# Patient Record
Sex: FEMALE | Race: BLACK OR AFRICAN AMERICAN | Employment: FULL TIME | URBAN - METROPOLITAN AREA
[De-identification: names, ages, dates, MRNs, and addresses within clinical notes are randomized per-mention and may not be internally consistent; named-entity substitution may affect disease eponyms.]

---

## 2019-09-29 ENCOUNTER — APPOINTMENT (OUTPATIENT)
Dept: GENERAL RADIOLOGY | Age: 44
End: 2019-09-29
Payer: OTHER MISCELLANEOUS

## 2019-09-29 ENCOUNTER — HOSPITAL ENCOUNTER (EMERGENCY)
Age: 44
Discharge: HOME | End: 2019-09-29
Attending: EMERGENCY MEDICINE
Payer: OTHER MISCELLANEOUS

## 2019-09-29 VITALS
OXYGEN SATURATION: 99 % | RESPIRATION RATE: 20 BRPM | SYSTOLIC BLOOD PRESSURE: 127 MMHG | TEMPERATURE: 98.5 F | DIASTOLIC BLOOD PRESSURE: 80 MMHG | HEART RATE: 83 BPM
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
## 2019-09-29 PROCEDURE — 71046 X-RAY EXAM CHEST 2 VIEWS: CPT

## 2019-09-29 PROCEDURE — 73502 X-RAY EXAM HIP UNI 2-3 VIEWS: CPT

## 2019-09-29 PROCEDURE — 6370000000 HC RX 637 (ALT 250 FOR IP): Performed by: STUDENT IN AN ORGANIZED HEALTH CARE EDUCATION/TRAINING PROGRAM

## 2019-09-29 PROCEDURE — 99283 EMERGENCY DEPT VISIT LOW MDM: CPT

## 2019-09-29 RX ORDER — IBUPROFEN 400 MG/1
800 TABLET ORAL ONCE
Status: COMPLETED | OUTPATIENT
Start: 2019-09-29 | End: 2019-09-29

## 2019-09-29 RX ADMIN — IBUPROFEN 800 MG: 400 TABLET ORAL at 13:10

## 2019-09-29 ASSESSMENT — ENCOUNTER SYMPTOMS
BACK PAIN: 0
VOMITING: 0
NAUSEA: 0
EYE PAIN: 0
SORE THROAT: 0
COUGH: 0
ABDOMINAL PAIN: 0
SHORTNESS OF BREATH: 0

## 2019-09-29 ASSESSMENT — PAIN DESCRIPTION - LOCATION: LOCATION: NECK;CHEST

## 2019-09-29 ASSESSMENT — PAIN SCALES - GENERAL
PAINLEVEL_OUTOF10: 6
PAINLEVEL_OUTOF10: 6

## 2019-09-29 ASSESSMENT — PAIN DESCRIPTION - PAIN TYPE: TYPE: ACUTE PAIN

## 2022-06-24 ENCOUNTER — HOSPITAL ENCOUNTER (EMERGENCY)
Age: 47
Discharge: HOME | End: 2022-06-24
Attending: EMERGENCY MEDICINE
Payer: COMMERCIAL

## 2022-06-24 ENCOUNTER — APPOINTMENT (OUTPATIENT)
Dept: GENERAL RADIOLOGY | Age: 47
End: 2022-06-24
Payer: COMMERCIAL

## 2022-06-24 VITALS
RESPIRATION RATE: 18 BRPM | SYSTOLIC BLOOD PRESSURE: 139 MMHG | HEART RATE: 92 BPM | OXYGEN SATURATION: 98 % | WEIGHT: 292 LBS | TEMPERATURE: 98.1 F | DIASTOLIC BLOOD PRESSURE: 78 MMHG

## 2022-06-24 DIAGNOSIS — E87.6 HYPOKALEMIA: ICD-10-CM

## 2022-06-24 DIAGNOSIS — R07.89 CHEST WALL PAIN: Primary | ICD-10-CM

## 2022-06-24 LAB
ANION GAP SERPL CALCULATED.3IONS-SCNC: 10 MMOL/L (ref 3–16)
BASOPHILS ABSOLUTE: 0.1 K/UL (ref 0–0.2)
BASOPHILS RELATIVE PERCENT: 0.9 %
BUN BLDV-MCNC: 14 MG/DL (ref 7–20)
CALCIUM SERPL-MCNC: 9.4 MG/DL (ref 8.3–10.6)
CHLORIDE BLD-SCNC: 101 MMOL/L (ref 99–110)
CO2: 27 MMOL/L (ref 21–32)
CREAT SERPL-MCNC: <0.5 MG/DL (ref 0.6–1.1)
EOSINOPHILS ABSOLUTE: 0.2 K/UL (ref 0–0.6)
EOSINOPHILS RELATIVE PERCENT: 2.6 %
GFR AFRICAN AMERICAN: >60
GFR NON-AFRICAN AMERICAN: >60
GLUCOSE BLD-MCNC: 127 MG/DL (ref 70–99)
HCT VFR BLD CALC: 36.9 % (ref 36–48)
HEMOGLOBIN: 12.3 G/DL (ref 12–16)
LYMPHOCYTES ABSOLUTE: 1.8 K/UL (ref 1–5.1)
LYMPHOCYTES RELATIVE PERCENT: 26.3 %
MAGNESIUM: 2 MG/DL (ref 1.8–2.4)
MCH RBC QN AUTO: 27.7 PG (ref 26–34)
MCHC RBC AUTO-ENTMCNC: 33.4 G/DL (ref 31–36)
MCV RBC AUTO: 82.9 FL (ref 80–100)
MONOCYTES ABSOLUTE: 0.4 K/UL (ref 0–1.3)
MONOCYTES RELATIVE PERCENT: 6.5 %
NEUTROPHILS ABSOLUTE: 4.3 K/UL (ref 1.7–7.7)
NEUTROPHILS RELATIVE PERCENT: 63.7 %
PDW BLD-RTO: 15.1 % (ref 12.4–15.4)
PLATELET # BLD: 296 K/UL (ref 135–450)
PMV BLD AUTO: 7.8 FL (ref 5–10.5)
POTASSIUM REFLEX MAGNESIUM: 3.4 MMOL/L (ref 3.5–5.1)
RBC # BLD: 4.45 M/UL (ref 4–5.2)
SODIUM BLD-SCNC: 138 MMOL/L (ref 136–145)
TROPONIN: <0.01 NG/ML
WBC # BLD: 6.7 K/UL (ref 4–11)

## 2022-06-24 PROCEDURE — 71046 X-RAY EXAM CHEST 2 VIEWS: CPT

## 2022-06-24 PROCEDURE — 80048 BASIC METABOLIC PNL TOTAL CA: CPT

## 2022-06-24 PROCEDURE — 99285 EMERGENCY DEPT VISIT HI MDM: CPT

## 2022-06-24 PROCEDURE — 6370000000 HC RX 637 (ALT 250 FOR IP): Performed by: EMERGENCY MEDICINE

## 2022-06-24 PROCEDURE — 84484 ASSAY OF TROPONIN QUANT: CPT

## 2022-06-24 PROCEDURE — 85025 COMPLETE CBC W/AUTO DIFF WBC: CPT

## 2022-06-24 PROCEDURE — 83735 ASSAY OF MAGNESIUM: CPT

## 2022-06-24 PROCEDURE — 93005 ELECTROCARDIOGRAM TRACING: CPT | Performed by: EMERGENCY MEDICINE

## 2022-06-24 RX ORDER — POTASSIUM CHLORIDE 750 MG/1
10 TABLET, EXTENDED RELEASE ORAL DAILY
Qty: 30 TABLET | Refills: 0 | Status: SHIPPED | OUTPATIENT
Start: 2022-06-24 | End: 2022-07-24

## 2022-06-24 RX ORDER — METHOCARBAMOL 750 MG/1
750 TABLET, FILM COATED ORAL 3 TIMES DAILY PRN
Qty: 30 TABLET | Refills: 0 | Status: SHIPPED | OUTPATIENT
Start: 2022-06-24 | End: 2022-07-04

## 2022-06-24 RX ORDER — IBUPROFEN 600 MG/1
600 TABLET ORAL EVERY 8 HOURS PRN
Qty: 20 TABLET | Refills: 0 | Status: SHIPPED | OUTPATIENT
Start: 2022-06-24

## 2022-06-24 RX ORDER — POTASSIUM CHLORIDE 750 MG/1
20 TABLET, EXTENDED RELEASE ORAL ONCE
Status: COMPLETED | OUTPATIENT
Start: 2022-06-24 | End: 2022-06-24

## 2022-06-24 RX ADMIN — POTASSIUM CHLORIDE 20 MEQ: 750 TABLET, EXTENDED RELEASE ORAL at 14:18

## 2022-06-24 ASSESSMENT — PAIN SCALES - GENERAL: PAINLEVEL_OUTOF10: 9

## 2022-06-24 ASSESSMENT — PAIN DESCRIPTION - LOCATION: LOCATION: CHEST

## 2022-06-24 ASSESSMENT — PAIN DESCRIPTION - DESCRIPTORS: DESCRIPTORS: SHOOTING;SHARP

## 2022-06-24 ASSESSMENT — PAIN DESCRIPTION - ORIENTATION: ORIENTATION: MID

## 2022-06-24 ASSESSMENT — PAIN - FUNCTIONAL ASSESSMENT: PAIN_FUNCTIONAL_ASSESSMENT: 0-10

## 2022-06-24 NOTE — ED TRIAGE NOTES
Pt c/o sharp shooting mid chest pain that began while she was bending over to help someone at work. Worsens with movement of arms and bending over. Began at 9:30.

## 2022-06-24 NOTE — ED PROVIDER NOTES
TRIAGE CHIEF COMPLAINT:   Chief Complaint   Patient presents with    Chest Pain       HPI: Frank Arroyo is a 48 y.o. female who presents to the emergency department with complaint of chest pain that started at 930 this morning at work described as sharp shooting pains in the mid chest.  Started while she was bending over. She states that is worse when she moves her arms especially the right arm or if she bends over. Not ripping or tearing. Not hyperacute. It does not radiate to the neck jaw or arms. It does not worsen with breathing or ambulation. No associated shortness of breath, cough or fever. Denies diaphoresis nausea or vomiting. She is not aware of any specific injury to her chest wall. Patient has a history of chest wall pain. She does have HTN but denies history of DM, HLD, smoking or family history of premature coronary disease. REVIEW OF SYSTEMS:   10 systems reviewed. Pertinent positives per HPI. Otherwise noted to be negative. I have reviewed the triage/nursing documentation and agree unless otherwise noted below. PAST MEDICAL HISTORY:   History reviewed. No pertinent past medical history. CURRENT MEDICATIONS:   Patient's Medications   New Prescriptions    No medications on file   Previous Medications    HYDROCHLOROTHIAZIDE PO    Take by mouth    NONFORMULARY    Migraine medication   Modified Medications    No medications on file   Discontinued Medications    No medications on file        SURGICAL HISTORY:   History reviewed. No pertinent surgical history. FAMILY HISTORY:   History reviewed. No pertinent family history. SOCIAL HISTORY:    reports that she has been smoking cigarettes. She has been smoking about 0.50 packs per day. She has never used smokeless tobacco. She reports current alcohol use. She reports previous drug use.     ALLERGIES: No Known Allergies    PHYSICAL EXAM:  VITAL SIGNS: /78   Pulse 92   Temp 98.1 °F (36.7 °C) (Oral)   Resp 18   Wt 292 lb (132.5 kg)   [facility] 06/03/2022   SpO2 98%   Constitutional:  No acute distress, Non-toxic appearance. Not pale or diaphoretic. No respiratory distress. HENT: Normocephalic, Atraumatic Oropharynx moist, No oral exudates. TMs are normal.  Eyes:  PERRL, EOMI, Conjunctiva normal, No discharge. Neck: No tenderness, Supple, No lymphadenopathy, No stridor. Cardiovascular:  Normal heart rate, Normal rhythm, No murmurs, No rubs, No gallops. Pulmonary/Chest:  Normal breath sounds, No respiratory distress, No wheezing. The patient has tenderness of the right pectoralis muscle which mimics her pain. This pain is aggravated by hyperextension of the right arm and contraction of the right pectoralis muscle. Abdomen:   Soft, No tenderness, No masses, No pulsatile masses  Back:  No tenderness, No CVA tenderness  Extremities:  Normal range of motion, Intact distal pulses, No edema, No tenderness  Neurologic:  Alert & oriented x 3, Speech is clear and appropriate, No upper extremity drift or lower extremity weakness,  Normal sensory function, No facial asymmetry, no truncal or extremity ataxia. Normal gait. Skin:  Warm, Dry, No erythema, No rash  Psychiatric:  Affect normal, Mood normal      EKG:    EKG interpreted by myself. Normal sinus rhythm at a rate of 87. Axis is 37 degrees. Mild nonspecific ST flattening noted. No ectopy. No ischemia. QTC is 466. Radiology:  XR CHEST (2 VW)   Final Result      No acute chest process. LAB  Labs Reviewed   BASIC METABOLIC PANEL W/ REFLEX TO MG FOR LOW K - Abnormal; Notable for the following components:       Result Value    Potassium reflex Magnesium 3.4 (*)     Glucose 127 (*)     CREATININE <0.5 (*)     All other components within normal limits   CBC WITH AUTO DIFFERENTIAL   TROPONIN   MAGNESIUM       ED COURSE & MEDICAL DECISION MAKING:  Pertinent Labs & Imaging studies reviewed.  (See chart for details)  78-year-old female with complaints of sharp right mid chest pain this morning while at work bending over. The pain is worse with movement of her arms especially the right arm. Not ripping or tearing. Not hyperacute. It is nonexertional and nonpleuritic. No radiation to the neck jaw or arms. Pain is reproducible by palpation of the right side of her chest and movement of the right arm. Lungs are clear. Heart is regular rate and rhythm. Abdomen is soft and nontender. EKG shows no ischemia or arrhythmia. Chest x-ray read by the radiologist and reviewed by myself was a negative study. CBC was normal.  BMP was normal with exception of a potassium of 3.4. She does take hydrochlorothiazide. Patient has been on potassium in the past but is not currently taking it. Troponin was normal.  Clinically the patient has chest wall pain. I see nothing to suggest ACS, dissection, pneumothorax, pneumonia or DVT/PE. Recommended ibuprofen for pain and Robaxin for muscle spasm. She was given a prescription for potassium. Advise follow-up with primary care. I discussed with Rita Bland the results of the evaluation in the Emergency Department, diagnosis, care, prognosis and the importance of follow-up. The patient is stable for discharge. The patient and/or family are in agreement with the plan and all questions have been answered. Specific discharge instructions were explained, including reasons to return to the emergency department.         (Please note that portions of this note may have been completed with a voice recognition program.  Efforts were made to edit the dictation but occasionally words are mis-transcribed)      FINAL IMPRESSION:  1 --chest wall pain  2 --hypokalemia                Gee Guillaume MD  06/24/22 0041

## 2022-06-24 NOTE — LETTER
[address] Emergency Department  [address] [address]  Phone: [phone]  Fax: [phone]             June 24, 2022    Patient: Kobe Modi   YOB: 1975   Date of Visit: 6/24/2022       To Whom It May Concern:    Kobe Modi was seen and treated in our emergency department on 6/24/2022.        Sincerely,             Signature:__________________________________

## 2022-06-27 LAB
EKG ATRIAL RATE: 87 BPM
EKG DIAGNOSIS: NORMAL
EKG P AXIS: 56 DEGREES
EKG P-R INTERVAL: 158 MS
EKG Q-T INTERVAL: 388 MS
EKG QRS DURATION: 74 MS
EKG QTC CALCULATION (BAZETT): 466 MS
EKG R AXIS: 37 DEGREES
EKG T AXIS: 13 DEGREES
EKG VENTRICULAR RATE: 87 BPM

## 2022-06-27 PROCEDURE — 93010 ELECTROCARDIOGRAM REPORT: CPT | Performed by: INTERNAL MEDICINE
